# Patient Record
Sex: MALE | Race: WHITE | NOT HISPANIC OR LATINO | URBAN - NONMETROPOLITAN AREA
[De-identification: names, ages, dates, MRNs, and addresses within clinical notes are randomized per-mention and may not be internally consistent; named-entity substitution may affect disease eponyms.]

---

## 2020-09-15 ENCOUNTER — IMPORTED ENCOUNTER (OUTPATIENT)
Dept: URBAN - NONMETROPOLITAN AREA CLINIC 1 | Facility: CLINIC | Age: 45
End: 2020-09-15

## 2020-09-15 PROBLEM — T15.92XA: Noted: 2020-09-15

## 2020-09-15 PROCEDURE — 65222 REMOVE FOREIGN BODY FROM EYE: CPT

## 2020-09-15 NOTE — PATIENT DISCUSSION
Corneal FB Discussed findings of exam in detail with the patient. Discussed the acute nature and treatment options with the patient. Discussed the importance of follow-up and the risk of serious infection  The patient was warned to return to the office immediately if they experience loss of vision or increased pain. The foreign body was removed at the slit lamp. Residual rust ring was removed with an automated ophthalmic siddhartha. START Ofloxacin qid OS until seen Thurs

## 2020-09-17 ENCOUNTER — IMPORTED ENCOUNTER (OUTPATIENT)
Dept: URBAN - NONMETROPOLITAN AREA CLINIC 1 | Facility: CLINIC | Age: 45
End: 2020-09-17

## 2020-09-17 PROCEDURE — 99213 OFFICE O/P EST LOW 20 MIN: CPT

## 2020-09-17 NOTE — PATIENT DISCUSSION
Corneal FB/Abrasion Discussed findings of exam in detail with the patient. Cont Ofloxacin qid OS until seen Tues

## 2020-09-22 ENCOUNTER — IMPORTED ENCOUNTER (OUTPATIENT)
Dept: URBAN - NONMETROPOLITAN AREA CLINIC 1 | Facility: CLINIC | Age: 45
End: 2020-09-22

## 2020-09-22 PROCEDURE — 99213 OFFICE O/P EST LOW 20 MIN: CPT

## 2020-10-13 PROBLEM — S05.02XD: Noted: 2020-10-13

## 2022-04-09 ASSESSMENT — VISUAL ACUITY
OD_CC: 20/30
OD_CC: 20/20
OS_CC: 20/25
OD_CC: 20/20
OS_CC: 20/20
OS_CC: 20/30-